# Patient Record
Sex: FEMALE | Race: WHITE | URBAN - NONMETROPOLITAN AREA
[De-identification: names, ages, dates, MRNs, and addresses within clinical notes are randomized per-mention and may not be internally consistent; named-entity substitution may affect disease eponyms.]

---

## 2017-07-20 PROBLEM — I37.0 NONRHEUMATIC PULMONARY VALVE STENOSIS: Status: ACTIVE | Noted: 2017-07-20

## 2017-07-20 PROBLEM — I27.20 PULMONARY HYPERTENSION (HCC): Status: ACTIVE | Noted: 2017-07-20

## 2017-09-21 PROBLEM — R06.00 DYSPNEA ON EXERTION: Status: ACTIVE | Noted: 2017-09-21

## 2017-09-21 PROBLEM — I27.20 PULMONARY HYPERTENSION (HCC): Status: RESOLVED | Noted: 2017-07-20 | Resolved: 2017-09-21

## 2017-09-21 PROBLEM — R07.2 PRECORDIAL PAIN: Status: ACTIVE | Noted: 2017-09-21

## 2018-02-02 DIAGNOSIS — C79.51 BONE METASTASIS (HCC): ICD-10-CM

## 2018-02-02 DIAGNOSIS — C23 GALLBLADDER CANCER (HCC): ICD-10-CM

## 2018-02-02 DIAGNOSIS — Z51.5 HOSPICE CARE PATIENT: Primary | ICD-10-CM

## 2018-02-02 RX ORDER — ATROPINE SULFATE 10 MG/ML
SOLUTION/ DROPS OPHTHALMIC
Qty: 5 ML | Refills: 2 | Status: SHIPPED | OUTPATIENT
Start: 2018-02-02

## 2018-02-02 RX ORDER — MORPHINE SULFATE 20 MG/1
20 CAPSULE, EXTENDED RELEASE ORAL 2 TIMES DAILY
Qty: 60 CAPSULE | Refills: 0 | Status: SHIPPED | OUTPATIENT
Start: 2018-02-02 | End: 2018-02-27

## 2018-02-02 RX ORDER — FENTANYL 75 UG/H
1 PATCH TRANSDERMAL
Qty: 10 PATCH | Refills: 0 | Status: SHIPPED | OUTPATIENT
Start: 2018-02-02 | End: 2018-03-20 | Stop reason: DRUGHIGH

## 2018-02-02 RX ORDER — MORPHINE SULFATE 20 MG/ML
SOLUTION ORAL
Qty: 30 ML | Refills: 0 | Status: SHIPPED | OUTPATIENT
Start: 2018-02-02 | End: 2018-02-13

## 2018-02-13 DIAGNOSIS — Z51.5 HOSPICE CARE PATIENT: ICD-10-CM

## 2018-02-13 DIAGNOSIS — C79.51 BONE METASTASIS (HCC): ICD-10-CM

## 2018-02-13 DIAGNOSIS — C23 GALLBLADDER CANCER (HCC): ICD-10-CM

## 2018-02-13 RX ORDER — MORPHINE SULFATE 20 MG/ML
20 SOLUTION ORAL EVERY 4 HOURS PRN
Qty: 120 ML | Refills: 0 | Status: SHIPPED | OUTPATIENT
Start: 2018-02-13 | End: 2018-03-05

## 2018-02-27 DIAGNOSIS — Z51.5 HOSPICE CARE PATIENT: ICD-10-CM

## 2018-02-27 DIAGNOSIS — C23 GALLBLADDER CANCER (HCC): ICD-10-CM

## 2018-02-27 DIAGNOSIS — C79.51 BONE METASTASIS (HCC): ICD-10-CM

## 2018-02-27 RX ORDER — MORPHINE SULFATE 20 MG/1
20 CAPSULE, EXTENDED RELEASE ORAL 2 TIMES DAILY
Qty: 60 CAPSULE | Refills: 0 | Status: SHIPPED | OUTPATIENT
Start: 2018-02-27 | End: 2018-03-29

## 2018-03-05 DIAGNOSIS — C79.51 BONE METASTASIS (HCC): ICD-10-CM

## 2018-03-05 DIAGNOSIS — C23 GALLBLADDER CANCER (HCC): ICD-10-CM

## 2018-03-05 DIAGNOSIS — Z51.5 HOSPICE CARE PATIENT: ICD-10-CM

## 2018-03-05 RX ORDER — MORPHINE SULFATE 20 MG/ML
20 SOLUTION ORAL EVERY 4 HOURS PRN
Qty: 120 ML | Refills: 0 | Status: SHIPPED | OUTPATIENT
Start: 2018-03-05

## 2018-03-20 DIAGNOSIS — Z51.5 HOSPICE CARE PATIENT: Primary | ICD-10-CM

## 2018-03-20 DIAGNOSIS — C23 GALLBLADDER CANCER (HCC): ICD-10-CM

## 2018-03-20 DIAGNOSIS — C79.51 BONE METASTASIS (HCC): ICD-10-CM

## 2018-03-20 RX ORDER — FENTANYL 100 UG/H
1 PATCH TRANSDERMAL
Qty: 10 PATCH | Refills: 0 | Status: SHIPPED | OUTPATIENT
Start: 2018-03-20 | End: 2018-04-19

## 2018-03-22 ENCOUNTER — MED REC SCAN ONLY (OUTPATIENT)
Dept: FAMILY MEDICINE CLINIC | Facility: CLINIC | Age: 58
End: 2018-03-22

## 2018-03-22 NOTE — TELEPHONE ENCOUNTER
Requesting refill of MS early. Last ordered 3/5/18 120ml. Advised okay to fill early, SALVADOR Monsalve. See scanned signed order.

## 2018-04-13 ENCOUNTER — MED REC SCAN ONLY (OUTPATIENT)
Dept: FAMILY MEDICINE CLINIC | Facility: CLINIC | Age: 58
End: 2018-04-13